# Patient Record
Sex: MALE | URBAN - METROPOLITAN AREA
[De-identification: names, ages, dates, MRNs, and addresses within clinical notes are randomized per-mention and may not be internally consistent; named-entity substitution may affect disease eponyms.]

---

## 2023-05-16 LAB
ANION GAP IN SER/PLAS: 13 MMOL/L (ref 10–20)
CALCIUM (MG/DL) IN SER/PLAS: 9.3 MG/DL (ref 8.6–10.3)
CARBON DIOXIDE, TOTAL (MMOL/L) IN SER/PLAS: 24 MMOL/L (ref 21–32)
CHLORIDE (MMOL/L) IN SER/PLAS: 107 MMOL/L (ref 98–107)
CREATININE (MG/DL) IN SER/PLAS: 1.03 MG/DL (ref 0.5–1.3)
ERYTHROCYTE DISTRIBUTION WIDTH (RATIO) BY AUTOMATED COUNT: 12.3 % (ref 11.5–14.5)
ERYTHROCYTE MEAN CORPUSCULAR HEMOGLOBIN CONCENTRATION (G/DL) BY AUTOMATED: 32.7 G/DL (ref 32–36)
ERYTHROCYTE MEAN CORPUSCULAR VOLUME (FL) BY AUTOMATED COUNT: 89 FL (ref 80–100)
ERYTHROCYTES (10*6/UL) IN BLOOD BY AUTOMATED COUNT: 4.61 X10E12/L (ref 4.5–5.9)
GFR MALE: 77 ML/MIN/1.73M2
GLUCOSE (MG/DL) IN SER/PLAS: 136 MG/DL (ref 74–99)
HEMATOCRIT (%) IN BLOOD BY AUTOMATED COUNT: 41 % (ref 41–52)
HEMOGLOBIN (G/DL) IN BLOOD: 13.4 G/DL (ref 13.5–17.5)
INR IN PPP BY COAGULATION ASSAY: 1.1 (ref 0.9–1.1)
LEUKOCYTES (10*3/UL) IN BLOOD BY AUTOMATED COUNT: 5.8 X10E9/L (ref 4.4–11.3)
PLATELETS (10*3/UL) IN BLOOD AUTOMATED COUNT: 155 X10E9/L (ref 150–450)
POTASSIUM (MMOL/L) IN SER/PLAS: 4.5 MMOL/L (ref 3.5–5.3)
PROTHROMBIN TIME (PT) IN PPP BY COAGULATION ASSAY: 12.2 SEC (ref 9.8–13.4)
SODIUM (MMOL/L) IN SER/PLAS: 139 MMOL/L (ref 136–145)
UREA NITROGEN (MG/DL) IN SER/PLAS: 30 MG/DL (ref 6–23)

## 2023-05-26 ENCOUNTER — HOSPITAL ENCOUNTER (OUTPATIENT)
Dept: DATA CONVERSION | Facility: HOSPITAL | Age: 73
End: 2023-05-26
Attending: INTERNAL MEDICINE | Admitting: INTERNAL MEDICINE

## 2023-05-26 DIAGNOSIS — E88.810 METABOLIC SYNDROME: ICD-10-CM

## 2023-05-26 DIAGNOSIS — E78.5 HYPERLIPIDEMIA, UNSPECIFIED: ICD-10-CM

## 2023-05-26 DIAGNOSIS — E66.9 OBESITY, UNSPECIFIED: ICD-10-CM

## 2023-05-26 DIAGNOSIS — I25.10 ATHEROSCLEROTIC HEART DISEASE OF NATIVE CORONARY ARTERY WITHOUT ANGINA PECTORIS: ICD-10-CM

## 2023-05-26 DIAGNOSIS — Z87.891 PERSONAL HISTORY OF NICOTINE DEPENDENCE: ICD-10-CM

## 2023-05-26 DIAGNOSIS — E11.9 TYPE 2 DIABETES MELLITUS WITHOUT COMPLICATIONS (MULTI): ICD-10-CM

## 2023-05-26 DIAGNOSIS — I10 ESSENTIAL (PRIMARY) HYPERTENSION: ICD-10-CM

## 2023-05-30 LAB
POC ACTIVATED CLOTTING TIME LOW RANGE: 262 SECONDS (ref 89–169)
POC ACTIVATED CLOTTING TIME LOW RANGE: 360 SECONDS (ref 89–169)

## 2023-06-14 LAB
ATRIAL RATE: 61 BPM
P AXIS: 21 DEGREES
P OFFSET: 173 MS
P ONSET: 118 MS
PR INTERVAL: 186 MS
Q ONSET: 211 MS
QRS COUNT: 10 BEATS
QRS DURATION: 120 MS
QT INTERVAL: 444 MS
QTC CALCULATION(BAZETT): 446 MS
QTC FREDERICIA: 446 MS
R AXIS: -42 DEGREES
T AXIS: 13 DEGREES
T OFFSET: 433 MS
VENTRICULAR RATE: 61 BPM

## 2023-06-20 LAB — SARS-COV-2 RESULT: NOT DETECTED

## 2023-07-06 LAB
ANION GAP IN SER/PLAS: 14 MMOL/L (ref 10–20)
CALCIUM (MG/DL) IN SER/PLAS: 8.9 MG/DL (ref 8.6–10.3)
CARBON DIOXIDE, TOTAL (MMOL/L) IN SER/PLAS: 26 MMOL/L (ref 21–32)
CHLORIDE (MMOL/L) IN SER/PLAS: 102 MMOL/L (ref 98–107)
CREATININE (MG/DL) IN SER/PLAS: 0.99 MG/DL (ref 0.5–1.3)
ERYTHROCYTE DISTRIBUTION WIDTH (RATIO) BY AUTOMATED COUNT: 12.4 % (ref 11.5–14.5)
ERYTHROCYTE MEAN CORPUSCULAR HEMOGLOBIN CONCENTRATION (G/DL) BY AUTOMATED: 30.9 G/DL (ref 32–36)
ERYTHROCYTE MEAN CORPUSCULAR VOLUME (FL) BY AUTOMATED COUNT: 91 FL (ref 80–100)
ERYTHROCYTES (10*6/UL) IN BLOOD BY AUTOMATED COUNT: 3.69 X10E12/L (ref 4.5–5.9)
GFR MALE: 80 ML/MIN/1.73M2
GLUCOSE (MG/DL) IN SER/PLAS: 122 MG/DL (ref 74–99)
HEMATOCRIT (%) IN BLOOD BY AUTOMATED COUNT: 33.7 % (ref 41–52)
HEMOGLOBIN (G/DL) IN BLOOD: 10.4 G/DL (ref 13.5–17.5)
LEUKOCYTES (10*3/UL) IN BLOOD BY AUTOMATED COUNT: 9.7 X10E9/L (ref 4.4–11.3)
MAGNESIUM (MG/DL) IN SER/PLAS: 1.72 MG/DL (ref 1.6–2.4)
PLATELETS (10*3/UL) IN BLOOD AUTOMATED COUNT: 447 X10E9/L (ref 150–450)
POTASSIUM (MMOL/L) IN SER/PLAS: 4.2 MMOL/L (ref 3.5–5.3)
SODIUM (MMOL/L) IN SER/PLAS: 138 MMOL/L (ref 136–145)
UREA NITROGEN (MG/DL) IN SER/PLAS: 20 MG/DL (ref 6–23)

## 2025-03-03 ENCOUNTER — TELEPHONE (OUTPATIENT)
Dept: CARDIOLOGY | Facility: HOSPITAL | Age: 75
End: 2025-03-03
Payer: MEDICARE

## 2025-03-03 NOTE — TELEPHONE ENCOUNTER
LVM for pt regarding appt with Dr. Cervantes - being referred for abnormal holter monitor results by Dr. Baker [facility]. Saved pt appt 3/6 at [facility] w/ Dr. Cervantes and req he call office to confirm.    Pt LVM returning my call and confirmed appt.

## 2025-03-05 PROBLEM — R35.1 NOCTURIA: Status: ACTIVE | Noted: 2021-07-25

## 2025-03-05 PROBLEM — I10 ESSENTIAL HYPERTENSION: Status: ACTIVE | Noted: 2017-02-22

## 2025-03-05 PROBLEM — I25.10 ARTERIOSCLEROSIS OF CORONARY ARTERY: Status: ACTIVE | Noted: 2023-05-16

## 2025-03-05 PROBLEM — C61 MALIGNANT NEOPLASM OF PROSTATE (MULTI): Status: ACTIVE | Noted: 2017-02-22

## 2025-03-05 PROBLEM — R91.1 LUNG NODULE: Status: ACTIVE | Noted: 2025-03-05

## 2025-03-05 PROBLEM — N52.9 MALE ERECTILE DYSFUNCTION, UNSPECIFIED: Status: ACTIVE | Noted: 2017-02-22

## 2025-03-05 PROBLEM — J44.9 CHRONIC OBSTRUCTIVE PULMONARY DISEASE (MULTI): Status: ACTIVE | Noted: 2025-03-05

## 2025-03-05 PROBLEM — G43.909 MIGRAINE HEADACHE: Status: ACTIVE | Noted: 2025-03-05

## 2025-03-05 PROBLEM — G47.33 OBSTRUCTIVE SLEEP APNEA SYNDROME: Status: ACTIVE | Noted: 2025-03-05

## 2025-03-05 PROBLEM — N45.1 EPIDIDYMITIS: Status: ACTIVE | Noted: 2017-02-22

## 2025-03-05 PROBLEM — R55 SYNCOPE AND COLLAPSE: Status: ACTIVE | Noted: 2025-01-29

## 2025-03-05 PROBLEM — K21.9 GASTROESOPHAGEAL REFLUX DISEASE WITHOUT ESOPHAGITIS: Status: ACTIVE | Noted: 2017-01-04

## 2025-03-05 PROBLEM — N41.9 PROSTATITIS: Status: ACTIVE | Noted: 2017-02-22

## 2025-03-05 PROBLEM — R97.20 ELEVATED PROSTATE SPECIFIC ANTIGEN (PSA): Status: ACTIVE | Noted: 2017-02-22

## 2025-03-05 PROBLEM — E11.9 DIABETES MELLITUS (MULTI): Status: ACTIVE | Noted: 2025-03-05

## 2025-03-05 PROBLEM — G45.9 TRANSIENT ISCHEMIC ATTACK: Status: ACTIVE | Noted: 2025-03-05

## 2025-03-05 RX ORDER — TAMSULOSIN HYDROCHLORIDE 0.4 MG/1
0.4 CAPSULE ORAL DAILY
COMMUNITY
Start: 2023-05-02 | End: 2025-03-06 | Stop reason: WASHOUT

## 2025-03-05 RX ORDER — GABAPENTIN 300 MG/1
300 CAPSULE ORAL 4 TIMES DAILY
COMMUNITY

## 2025-03-05 RX ORDER — HYDROCODONE BITARTRATE AND ACETAMINOPHEN 5; 325 MG/1; MG/1
TABLET ORAL
COMMUNITY
End: 2025-03-06 | Stop reason: WASHOUT

## 2025-03-05 RX ORDER — AMOXICILLIN AND CLAVULANATE POTASSIUM 875; 125 MG/1; MG/1
TABLET, FILM COATED ORAL
COMMUNITY
End: 2025-03-06 | Stop reason: WASHOUT

## 2025-03-05 RX ORDER — ASPIRIN 81 MG/1
81 TABLET ORAL
COMMUNITY
Start: 2023-05-02

## 2025-03-05 RX ORDER — PREDNISONE 20 MG/1
20 TABLET ORAL DAILY
COMMUNITY
End: 2025-03-06 | Stop reason: WASHOUT

## 2025-03-05 RX ORDER — DICYCLOMINE HYDROCHLORIDE 20 MG/1
20 TABLET ORAL
COMMUNITY
End: 2025-03-06 | Stop reason: WASHOUT

## 2025-03-05 RX ORDER — ATORVASTATIN CALCIUM 10 MG/1
1 TABLET, FILM COATED ORAL DAILY
COMMUNITY
End: 2025-03-06 | Stop reason: WASHOUT

## 2025-03-05 RX ORDER — ERGOCALCIFEROL 1.25 MG/1
50000 CAPSULE ORAL WEEKLY
COMMUNITY

## 2025-03-05 RX ORDER — LOSARTAN POTASSIUM 100 MG/1
1 TABLET ORAL DAILY
COMMUNITY
Start: 2023-05-02 | End: 2025-03-06 | Stop reason: WASHOUT

## 2025-03-05 RX ORDER — GLUCOSAMINE/MSM/CHONDROIT SULF 500-166.6
1 TABLET ORAL DAILY
COMMUNITY
Start: 2023-07-14 | End: 2025-03-06 | Stop reason: WASHOUT

## 2025-03-05 RX ORDER — BUPROPION HYDROCHLORIDE 300 MG/1
300 TABLET ORAL EVERY MORNING
COMMUNITY
End: 2025-03-06 | Stop reason: WASHOUT

## 2025-03-05 RX ORDER — METFORMIN HYDROCHLORIDE 750 MG/1
750 TABLET, EXTENDED RELEASE ORAL 2 TIMES DAILY
COMMUNITY

## 2025-03-05 RX ORDER — NITROGLYCERIN 0.4 MG/1
0.4 TABLET SUBLINGUAL EVERY 5 MIN PRN
COMMUNITY
Start: 2023-07-14

## 2025-03-05 RX ORDER — VIT C/E/ZN/COPPR/LUTEIN/ZEAXAN 250MG-90MG
1 CAPSULE ORAL DAILY
COMMUNITY

## 2025-03-05 RX ORDER — ROSUVASTATIN CALCIUM 10 MG/1
10 TABLET, COATED ORAL DAILY
COMMUNITY

## 2025-03-05 RX ORDER — METOPROLOL SUCCINATE 50 MG/1
1 TABLET, EXTENDED RELEASE ORAL
COMMUNITY
Start: 2025-01-28

## 2025-03-06 ENCOUNTER — OFFICE VISIT (OUTPATIENT)
Dept: CARDIOLOGY | Facility: CLINIC | Age: 75
End: 2025-03-06
Payer: MEDICARE

## 2025-03-06 VITALS
BODY MASS INDEX: 28.06 KG/M2 | HEART RATE: 67 BPM | HEIGHT: 70 IN | WEIGHT: 196 LBS | SYSTOLIC BLOOD PRESSURE: 120 MMHG | DIASTOLIC BLOOD PRESSURE: 70 MMHG

## 2025-03-06 DIAGNOSIS — R55 SYNCOPE AND COLLAPSE: ICD-10-CM

## 2025-03-06 DIAGNOSIS — I25.10 ARTERIOSCLEROSIS OF CORONARY ARTERY: Primary | ICD-10-CM

## 2025-03-06 DIAGNOSIS — R55 SYNCOPE AND COLLAPSE: Primary | ICD-10-CM

## 2025-03-06 PROCEDURE — 3078F DIAST BP <80 MM HG: CPT | Performed by: INTERNAL MEDICINE

## 2025-03-06 PROCEDURE — 93000 ELECTROCARDIOGRAM COMPLETE: CPT | Performed by: INTERNAL MEDICINE

## 2025-03-06 PROCEDURE — 3074F SYST BP LT 130 MM HG: CPT | Performed by: INTERNAL MEDICINE

## 2025-03-06 PROCEDURE — 1159F MED LIST DOCD IN RCRD: CPT | Performed by: INTERNAL MEDICINE

## 2025-03-06 PROCEDURE — 3008F BODY MASS INDEX DOCD: CPT | Performed by: INTERNAL MEDICINE

## 2025-03-06 PROCEDURE — 99203 OFFICE O/P NEW LOW 30 MIN: CPT | Performed by: INTERNAL MEDICINE

## 2025-03-06 RX ORDER — FLUTICASONE PROPIONATE 50 UG/1
1 POWDER, METERED RESPIRATORY (INHALATION)
COMMUNITY

## 2025-03-06 RX ORDER — OLOPATADINE HYDROCHLORIDE 1 MG/ML
1 SOLUTION/ DROPS OPHTHALMIC 2 TIMES DAILY
COMMUNITY

## 2025-03-06 NOTE — H&P (VIEW-ONLY)
Subjective:  The patient is a 74-year-old white male, followed primarily by Dr. Kenneth Reich, who presents for evaluation because of AV block on a recent event monitor.  He has a history of longstanding hypertension, type 2 diabetes, obstructive sleep apnea (on CPAP therapy), and coronary artery disease with a significant LAD stenosis for which she underwent MIDCAB surgery in June 2023 (off-pump ESTES to LAD).  The patient is known to have normal left ventricular systolic function with an ejection fraction of 60 to 65%.    Since late January 2025, the patient has had at least 3 episodes of sudden onset lightheadedness with near-syncope.  He feels as though he is shaking on the inside, though not physically having tremors.  He has to sit down, and his symptoms resolved over several minutes.  He has not had associated palpitations, nausea, emesis, or pallor.  With one of his episodes, he used a blood pressure cuff to record his vital signs and had no normal blood pressure but a heart rate in the low 40s.  He was fitted with a cardiac event monitor which she wore in February 2025.  He had a third episode of severe lightheadedness that correlated with 2:1 AV block, and even had 2 consecutive nonconducted P waves causing a pause of around 4 seconds.  His QRS complexes remained narrow.  Urgent electrophysiology referral was recommended.    The patient is  and lives in [place].  He is retired from various industrial jobs, including a job as an .  Because of his recent near-syncope, he has been instructed not to drive.    Current Outpatient Medications   Medication Sig    alfuzosin HCl (ALFUZOSIN ORAL) Take 10 mg by mouth once daily.    aspirin 81 mg EC tablet Take 1 tablet (81 mg) by mouth once daily.    empagliflozin (Jardiance) 10 mg Take 1 tablet (10 mg) by mouth early in the morning..    ergocalciferol (Vitamin D-2) 1250 mcg (50,000 units) capsule Take 1 capsule (50,000 Units) by mouth 1 (one) time  "per week.    fluticasone (Flovent Diskus) 50 mcg/actuation diskus inhaler Inhale 1 puff 2 times a day. Rinse mouth with water after use to reduce aftertaste and incidence of candidiasis. Do not swallow.    gabapentin (Neurontin) 300 mg capsule Take 1 capsule (300 mg) by mouth 4 times a day.    metFORMIN XR (Glucophage-XR) 750 mg 24 hr tablet Take 1 tablet (750 mg) by mouth 2 times a day.    metoprolol succinate XL (Toprol-XL) 50 mg 24 hr tablet Take 1 tablet (50 mg) by mouth early in the morning..    nitroglycerin (Nitrostat) 0.4 mg SL tablet Place 1 tablet (0.4 mg) under the tongue every 5 minutes if needed for chest pain. Nitroglycerin 0.4 MG Sublingual Tablet Sublingual DISSOLVE 1 TABLET UNDER THE TONGUE AS NEEDED FOR CHEST PAIN    olopatadine (Patanol) 0.1 % ophthalmic solution 1 drop 2 times a day.    omega-3 fatty acids-fish oil (Fish OiL) 360-1,200 mg capsule Take 1 capsule (1,200 mg) by mouth once daily.    rosuvastatin (Crestor) 10 mg tablet Take 1 tablet (10 mg) by mouth once daily.     Allergies:  Patient has no known allergies.     Patient Active Problem List   Diagnosis    Arteriosclerosis of coronary artery    Chronic obstructive pulmonary disease (Multi)    Diabetes mellitus (Multi)    Elevated prostate specific antigen (PSA)    Epididymitis    Prostatitis    Essential hypertension    Gastroesophageal reflux disease without esophagitis    Lung nodule    Male erectile dysfunction, unspecified    Malignant neoplasm of prostate (Multi)    Migraine headache    Mixed conductive and sensorineural hearing loss    Nocturia    Obstructive sleep apnea syndrome    Syncope and collapse    Transient ischemic attack     Past Surgical History:   Procedure Laterality Date    CHOLECYSTECTOMY      CORONARY ARTERY BYPASS GRAFT      MASTOID SURGERY      TONSILLECTOMY       Objective:  Vitals:    03/06/25 0926   BP: 120/70   Pulse: 67   Height:     1.778 m (5' 10\")  Weight: 88.9 kg (196 lb)     Exam:  Gen: Pleasant " gentleman in no distress; alert and oriented.  HEENT: No scleral icterus.  Neck: No jugular venous distention or thyromegaly.  Lungs: Clear to auscultation, with no wheezes or rales.  Heart: Regular rhythm without murmurs or gallops appreciated.  Abdomen: Benign, with no organomegaly or masses.  Extremities: Intact distal pulses; no edema.  Neuro: No focal neurologic abnormalities.  Skin: No cutaneous lesions.    EKG: An EKG on 3/6/2025 showed sinus rhythm at 67 bpm with a borderline first-degree AV block (KS 0.21 seconds), a left anterior fascicular block, early transition, and possible left ventricular hypertrophy    Impressions:  1.  Hypertension, controlled.  2.  Coronary artery disease, status post MIDCAB in 2023 with ESTES to LAD.  The patient has preserved LV systolic function and is free of angina.  3.  Type 2 diabetes, stable.  4.  Recent spells of abrupt lightheadedness, with one such episode correlating with 2:1 and transient high-grade AV block by event monitoring.  This is likely AV ayush rather than due to His-Purkinje disease in the setting of a narrow QRS complex, but represents a fairly strong indication for permanent pacing since it was not due to any reversible cause.  5.  Obstructive sleep apnea, on CPAP therapy.    Recommendations:  1.  I showed the patient a copy of his rhythm strips from the event monitor, including the spell of consecutive nonconducted P waves.  2.  I recommend permanent DDDR pacemaker placement in the near future with the use of a left bundle branch area lead for ventricular pacing to hopefully provide a narrow QRS complex for ventricular pacing, should the patient ultimately developed complete heart block and become pacemaker-dependent.  A Medtronic unit will be utilized.      Yang Cervantes MD    Is This A New Presentation, Or A Follow-Up?: Infection

## 2025-03-06 NOTE — PROGRESS NOTES
Subjective:  The patient is a 74-year-old white male, followed primarily by Dr. Kenneth Reich, who presents for evaluation because of AV block on a recent event monitor.  He has a history of longstanding hypertension, type 2 diabetes, obstructive sleep apnea (on CPAP therapy), and coronary artery disease with a significant LAD stenosis for which she underwent MIDCAB surgery in June 2023 (off-pump ESTES to LAD).  The patient is known to have normal left ventricular systolic function with an ejection fraction of 60 to 65%.    Since late January 2025, the patient has had at least 3 episodes of sudden onset lightheadedness with near-syncope.  He feels as though he is shaking on the inside, though not physically having tremors.  He has to sit down, and his symptoms resolved over several minutes.  He has not had associated palpitations, nausea, emesis, or pallor.  With one of his episodes, he used a blood pressure cuff to record his vital signs and had no normal blood pressure but a heart rate in the low 40s.  He was fitted with a cardiac event monitor which she wore in February 2025.  He had a third episode of severe lightheadedness that correlated with 2:1 AV block, and even had 2 consecutive nonconducted P waves causing a pause of around 4 seconds.  His QRS complexes remained narrow.  Urgent electrophysiology referral was recommended.    The patient is  and lives in [place].  He is retired from various industrial jobs, including a job as an .  Because of his recent near-syncope, he has been instructed not to drive.    Current Outpatient Medications   Medication Sig    alfuzosin HCl (ALFUZOSIN ORAL) Take 10 mg by mouth once daily.    aspirin 81 mg EC tablet Take 1 tablet (81 mg) by mouth once daily.    empagliflozin (Jardiance) 10 mg Take 1 tablet (10 mg) by mouth early in the morning..    ergocalciferol (Vitamin D-2) 1250 mcg (50,000 units) capsule Take 1 capsule (50,000 Units) by mouth 1 (one) time  "per week.    fluticasone (Flovent Diskus) 50 mcg/actuation diskus inhaler Inhale 1 puff 2 times a day. Rinse mouth with water after use to reduce aftertaste and incidence of candidiasis. Do not swallow.    gabapentin (Neurontin) 300 mg capsule Take 1 capsule (300 mg) by mouth 4 times a day.    metFORMIN XR (Glucophage-XR) 750 mg 24 hr tablet Take 1 tablet (750 mg) by mouth 2 times a day.    metoprolol succinate XL (Toprol-XL) 50 mg 24 hr tablet Take 1 tablet (50 mg) by mouth early in the morning..    nitroglycerin (Nitrostat) 0.4 mg SL tablet Place 1 tablet (0.4 mg) under the tongue every 5 minutes if needed for chest pain. Nitroglycerin 0.4 MG Sublingual Tablet Sublingual DISSOLVE 1 TABLET UNDER THE TONGUE AS NEEDED FOR CHEST PAIN    olopatadine (Patanol) 0.1 % ophthalmic solution 1 drop 2 times a day.    omega-3 fatty acids-fish oil (Fish OiL) 360-1,200 mg capsule Take 1 capsule (1,200 mg) by mouth once daily.    rosuvastatin (Crestor) 10 mg tablet Take 1 tablet (10 mg) by mouth once daily.     Allergies:  Patient has no known allergies.     Patient Active Problem List   Diagnosis    Arteriosclerosis of coronary artery    Chronic obstructive pulmonary disease (Multi)    Diabetes mellitus (Multi)    Elevated prostate specific antigen (PSA)    Epididymitis    Prostatitis    Essential hypertension    Gastroesophageal reflux disease without esophagitis    Lung nodule    Male erectile dysfunction, unspecified    Malignant neoplasm of prostate (Multi)    Migraine headache    Mixed conductive and sensorineural hearing loss    Nocturia    Obstructive sleep apnea syndrome    Syncope and collapse    Transient ischemic attack     Past Surgical History:   Procedure Laterality Date    CHOLECYSTECTOMY      CORONARY ARTERY BYPASS GRAFT      MASTOID SURGERY      TONSILLECTOMY       Objective:  Vitals:    03/06/25 0926   BP: 120/70   Pulse: 67   Height:     1.778 m (5' 10\")  Weight: 88.9 kg (196 lb)     Exam:  Gen: Pleasant " gentleman in no distress; alert and oriented.  HEENT: No scleral icterus.  Neck: No jugular venous distention or thyromegaly.  Lungs: Clear to auscultation, with no wheezes or rales.  Heart: Regular rhythm without murmurs or gallops appreciated.  Abdomen: Benign, with no organomegaly or masses.  Extremities: Intact distal pulses; no edema.  Neuro: No focal neurologic abnormalities.  Skin: No cutaneous lesions.    EKG: An EKG on 3/6/2025 showed sinus rhythm at 67 bpm with a borderline first-degree AV block (MI 0.21 seconds), a left anterior fascicular block, early transition, and possible left ventricular hypertrophy    Impressions:  1.  Hypertension, controlled.  2.  Coronary artery disease, status post MIDCAB in 2023 with ESTES to LAD.  The patient has preserved LV systolic function and is free of angina.  3.  Type 2 diabetes, stable.  4.  Recent spells of abrupt lightheadedness, with one such episode correlating with 2:1 and transient high-grade AV block by event monitoring.  This is likely AV ayush rather than due to His-Purkinje disease in the setting of a narrow QRS complex, but represents a fairly strong indication for permanent pacing since it was not due to any reversible cause.  5.  Obstructive sleep apnea, on CPAP therapy.    Recommendations:  1.  I showed the patient a copy of his rhythm strips from the event monitor, including the spell of consecutive nonconducted P waves.  2.  I recommend permanent DDDR pacemaker placement in the near future with the use of a left bundle branch area lead for ventricular pacing to hopefully provide a narrow QRS complex for ventricular pacing, should the patient ultimately developed complete heart block and become pacemaker-dependent.  A Medtronic unit will be utilized.      Yang Cervantes MD

## 2025-03-07 ENCOUNTER — TELEPHONE (OUTPATIENT)
Dept: CARDIOLOGY | Facility: HOSPITAL | Age: 75
End: 2025-03-07
Payer: MEDICARE

## 2025-03-07 NOTE — TELEPHONE ENCOUNTER
Pt agreeable to schedule Medtronic Dual PPM Implant with Dr. Cervantes Monday 3/24 1pm (arrive 12pm) [place]. Mailed pt reminder packet including lab orders 3/7 and task routed to Jessica RN for instructions call.     Paper checklist complete.

## 2025-03-18 LAB
ANION GAP SERPL CALCULATED.4IONS-SCNC: 11 MMOL/L (CALC) (ref 7–17)
BUN SERPL-MCNC: 26 MG/DL (ref 7–25)
BUN/CREAT SERPL: 22 (CALC) (ref 6–22)
CALCIUM SERPL-MCNC: 9.6 MG/DL (ref 8.6–10.3)
CHLORIDE SERPL-SCNC: 103 MMOL/L (ref 98–110)
CO2 SERPL-SCNC: 24 MMOL/L (ref 20–32)
CREAT SERPL-MCNC: 1.16 MG/DL (ref 0.7–1.28)
EGFRCR SERPLBLD CKD-EPI 2021: 66 ML/MIN/1.73M2
ERYTHROCYTE [DISTWIDTH] IN BLOOD BY AUTOMATED COUNT: 12.1 % (ref 11–15)
GLUCOSE SERPL-MCNC: 142 MG/DL (ref 65–139)
HCT VFR BLD AUTO: 44.3 % (ref 38.5–50)
HGB BLD-MCNC: 14.8 G/DL (ref 13.2–17.1)
MCH RBC QN AUTO: 28.8 PG (ref 27–33)
MCHC RBC AUTO-ENTMCNC: 33.4 G/DL (ref 32–36)
MCV RBC AUTO: 86.4 FL (ref 80–100)
PLATELET # BLD AUTO: 152 THOUSAND/UL (ref 140–400)
PMV BLD REES-ECKER: 11.8 FL (ref 7.5–12.5)
POTASSIUM SERPL-SCNC: 4.3 MMOL/L (ref 3.5–5.3)
RBC # BLD AUTO: 5.13 MILLION/UL (ref 4.2–5.8)
SODIUM SERPL-SCNC: 138 MMOL/L (ref 135–146)
WBC # BLD AUTO: 5 THOUSAND/UL (ref 3.8–10.8)

## 2025-03-21 ENCOUNTER — TELEPHONE (OUTPATIENT)
Dept: CARDIOLOGY | Facility: HOSPITAL | Age: 75
End: 2025-03-21
Payer: MEDICARE

## 2025-03-21 NOTE — TELEPHONE ENCOUNTER
Patient is scheduled for a pacemaker implant with Dr. Cervantes on 3/24/2025 at [place] with arrival time of noon. Labs are up to date. NPO after midnight the night before the procedure. Take morning medications with a sip of water. Patient confirms that he is not anticoagulated. Patient may need to stay overnight for observation. He will need a ride home. The patient verbalized understanding of instructions including appointment date, time, and location. All questions answered.

## 2025-03-24 ENCOUNTER — HOSPITAL ENCOUNTER (OUTPATIENT)
Facility: HOSPITAL | Age: 75
Discharge: HOME | End: 2025-03-25
Attending: INTERNAL MEDICINE | Admitting: INTERNAL MEDICINE
Payer: MEDICARE

## 2025-03-24 ENCOUNTER — APPOINTMENT (OUTPATIENT)
Dept: CARDIOLOGY | Facility: HOSPITAL | Age: 75
End: 2025-03-24
Payer: MEDICARE

## 2025-03-24 ENCOUNTER — APPOINTMENT (OUTPATIENT)
Dept: RADIOLOGY | Facility: HOSPITAL | Age: 75
End: 2025-03-24
Payer: MEDICARE

## 2025-03-24 DIAGNOSIS — R55 SYNCOPE AND COLLAPSE: Primary | ICD-10-CM

## 2025-03-24 LAB
GLUCOSE BLD MANUAL STRIP-MCNC: 161 MG/DL (ref 74–99)
GLUCOSE BLD MANUAL STRIP-MCNC: 254 MG/DL (ref 74–99)

## 2025-03-24 PROCEDURE — 93005 ELECTROCARDIOGRAM TRACING: CPT

## 2025-03-24 PROCEDURE — 2500000004 HC RX 250 GENERAL PHARMACY W/ HCPCS (ALT 636 FOR OP/ED): Performed by: INTERNAL MEDICINE

## 2025-03-24 PROCEDURE — C1785 PMKR, DUAL, RATE-RESP: HCPCS | Performed by: INTERNAL MEDICINE

## 2025-03-24 PROCEDURE — 2500000004 HC RX 250 GENERAL PHARMACY W/ HCPCS (ALT 636 FOR OP/ED): Performed by: NURSE PRACTITIONER

## 2025-03-24 PROCEDURE — C1898 LEAD, PMKR, OTHER THAN TRANS: HCPCS | Performed by: INTERNAL MEDICINE

## 2025-03-24 PROCEDURE — 33208 INSRT HEART PM ATRIAL & VENT: CPT | Performed by: INTERNAL MEDICINE

## 2025-03-24 PROCEDURE — 2500000005 HC RX 250 GENERAL PHARMACY W/O HCPCS: Performed by: NURSE PRACTITIONER

## 2025-03-24 PROCEDURE — 7100000009 HC PHASE TWO TIME - INITIAL BASE CHARGE: Performed by: INTERNAL MEDICINE

## 2025-03-24 PROCEDURE — 2780000003 HC OR 278 NO HCPCS: Performed by: INTERNAL MEDICINE

## 2025-03-24 PROCEDURE — C1892 INTRO/SHEATH,FIXED,PEEL-AWAY: HCPCS | Performed by: INTERNAL MEDICINE

## 2025-03-24 PROCEDURE — 82947 ASSAY GLUCOSE BLOOD QUANT: CPT

## 2025-03-24 PROCEDURE — 99153 MOD SED SAME PHYS/QHP EA: CPT | Performed by: INTERNAL MEDICINE

## 2025-03-24 PROCEDURE — 99152 MOD SED SAME PHYS/QHP 5/>YRS: CPT | Performed by: INTERNAL MEDICINE

## 2025-03-24 PROCEDURE — 2720000007 HC OR 272 NO HCPCS: Performed by: INTERNAL MEDICINE

## 2025-03-24 PROCEDURE — 7100000011 HC EXTENDED STAY RECOVERY HOURLY - NURSING UNIT

## 2025-03-24 PROCEDURE — 7100000010 HC PHASE TWO TIME - EACH INCREMENTAL 1 MINUTE: Performed by: INTERNAL MEDICINE

## 2025-03-24 PROCEDURE — 2500000001 HC RX 250 WO HCPCS SELF ADMINISTERED DRUGS (ALT 637 FOR MEDICARE OP): Performed by: NURSE PRACTITIONER

## 2025-03-24 PROCEDURE — 2750000001 HC OR 275 NO HCPCS: Performed by: INTERNAL MEDICINE

## 2025-03-24 PROCEDURE — C1887 CATHETER, GUIDING: HCPCS | Performed by: INTERNAL MEDICINE

## 2025-03-24 PROCEDURE — 71045 X-RAY EXAM CHEST 1 VIEW: CPT

## 2025-03-24 DEVICE — LEAD 383069 SELECT SECURE US EN FPA
Type: IMPLANTABLE DEVICE | Site: HEART | Status: FUNCTIONAL
Brand: SELECTSECURE™

## 2025-03-24 DEVICE — IPG W1DR01 AZURE XT DR MRI WL USA BCP
Type: IMPLANTABLE DEVICE | Site: CHEST  WALL | Status: FUNCTIONAL
Brand: AZURE™ XT DR MRI SURESCAN™

## 2025-03-24 DEVICE — LEAD 5076-52 CAPSUREFIX NOVUS US EN
Type: IMPLANTABLE DEVICE | Site: HEART | Status: FUNCTIONAL
Brand: CAPSUREFIX® NOVUS

## 2025-03-24 RX ORDER — ACETAMINOPHEN 650 MG/1
650 SUPPOSITORY RECTAL EVERY 4 HOURS PRN
Status: DISCONTINUED | OUTPATIENT
Start: 2025-03-24 | End: 2025-03-25 | Stop reason: HOSPADM

## 2025-03-24 RX ORDER — ASPIRIN 81 MG/1
81 TABLET ORAL
Status: DISCONTINUED | OUTPATIENT
Start: 2025-03-24 | End: 2025-03-25 | Stop reason: HOSPADM

## 2025-03-24 RX ORDER — ACETAMINOPHEN AND CODEINE PHOSPHATE 300; 30 MG/1; MG/1
1 TABLET ORAL EVERY 4 HOURS PRN
Status: DISCONTINUED | OUTPATIENT
Start: 2025-03-24 | End: 2025-03-25 | Stop reason: HOSPADM

## 2025-03-24 RX ORDER — NITROGLYCERIN 0.4 MG/1
0.4 TABLET SUBLINGUAL EVERY 5 MIN PRN
Status: DISCONTINUED | OUTPATIENT
Start: 2025-03-24 | End: 2025-03-25 | Stop reason: HOSPADM

## 2025-03-24 RX ORDER — ERGOCALCIFEROL 1.25 MG/1
50000 CAPSULE ORAL WEEKLY
Status: DISCONTINUED | OUTPATIENT
Start: 2025-03-24 | End: 2025-03-25 | Stop reason: HOSPADM

## 2025-03-24 RX ORDER — METOPROLOL SUCCINATE 50 MG/1
50 TABLET, EXTENDED RELEASE ORAL
Status: DISCONTINUED | OUTPATIENT
Start: 2025-03-24 | End: 2025-03-25 | Stop reason: HOSPADM

## 2025-03-24 RX ORDER — INSULIN LISPRO 100 [IU]/ML
0-5 INJECTION, SOLUTION INTRAVENOUS; SUBCUTANEOUS
Status: DISCONTINUED | OUTPATIENT
Start: 2025-03-24 | End: 2025-03-25 | Stop reason: HOSPADM

## 2025-03-24 RX ORDER — GABAPENTIN 300 MG/1
300 CAPSULE ORAL 3 TIMES DAILY
Status: DISCONTINUED | OUTPATIENT
Start: 2025-03-24 | End: 2025-03-25 | Stop reason: HOSPADM

## 2025-03-24 RX ORDER — NALOXONE HYDROCHLORIDE 0.4 MG/ML
0.2 INJECTION, SOLUTION INTRAMUSCULAR; INTRAVENOUS; SUBCUTANEOUS EVERY 5 MIN PRN
Status: DISCONTINUED | OUTPATIENT
Start: 2025-03-24 | End: 2025-03-25 | Stop reason: HOSPADM

## 2025-03-24 RX ORDER — ONDANSETRON 4 MG/1
4 TABLET, FILM COATED ORAL EVERY 8 HOURS PRN
Status: DISCONTINUED | OUTPATIENT
Start: 2025-03-24 | End: 2025-03-25 | Stop reason: HOSPADM

## 2025-03-24 RX ORDER — ONDANSETRON HYDROCHLORIDE 2 MG/ML
4 INJECTION, SOLUTION INTRAVENOUS EVERY 8 HOURS PRN
Status: DISCONTINUED | OUTPATIENT
Start: 2025-03-24 | End: 2025-03-25 | Stop reason: HOSPADM

## 2025-03-24 RX ORDER — ALFUZOSIN HYDROCHLORIDE 10 MG/1
10 TABLET, EXTENDED RELEASE ORAL
Status: DISCONTINUED | OUTPATIENT
Start: 2025-03-25 | End: 2025-03-24 | Stop reason: RX

## 2025-03-24 RX ORDER — LIDOCAINE HYDROCHLORIDE 20 MG/ML
INJECTION, SOLUTION INFILTRATION; PERINEURAL AS NEEDED
Status: DISCONTINUED | OUTPATIENT
Start: 2025-03-24 | End: 2025-03-24 | Stop reason: HOSPADM

## 2025-03-24 RX ORDER — FENTANYL CITRATE 50 UG/ML
INJECTION, SOLUTION INTRAMUSCULAR; INTRAVENOUS AS NEEDED
Status: DISCONTINUED | OUTPATIENT
Start: 2025-03-24 | End: 2025-03-24 | Stop reason: HOSPADM

## 2025-03-24 RX ORDER — MIDAZOLAM HYDROCHLORIDE 1 MG/ML
INJECTION, SOLUTION INTRAMUSCULAR; INTRAVENOUS AS NEEDED
Status: DISCONTINUED | OUTPATIENT
Start: 2025-03-24 | End: 2025-03-24 | Stop reason: HOSPADM

## 2025-03-24 RX ORDER — CHLORHEXIDINE GLUCONATE 40 MG/ML
SOLUTION TOPICAL ONCE
Status: COMPLETED | OUTPATIENT
Start: 2025-03-24 | End: 2025-03-24

## 2025-03-24 RX ORDER — ACETAMINOPHEN 325 MG/1
650 TABLET ORAL EVERY 4 HOURS PRN
Status: DISCONTINUED | OUTPATIENT
Start: 2025-03-24 | End: 2025-03-25 | Stop reason: HOSPADM

## 2025-03-24 RX ORDER — SODIUM CHLORIDE 9 MG/ML
30 INJECTION, SOLUTION INTRAVENOUS CONTINUOUS
Status: ACTIVE | OUTPATIENT
Start: 2025-03-24 | End: 2025-03-24

## 2025-03-24 RX ORDER — DEXTROSE 50 % IN WATER (D50W) INTRAVENOUS SYRINGE
12.5
Status: DISCONTINUED | OUTPATIENT
Start: 2025-03-24 | End: 2025-03-25 | Stop reason: HOSPADM

## 2025-03-24 RX ORDER — CEFAZOLIN SODIUM 2 G/50ML
2 SOLUTION INTRAVENOUS ONCE
Status: COMPLETED | OUTPATIENT
Start: 2025-03-24 | End: 2025-03-24

## 2025-03-24 RX ORDER — ROSUVASTATIN CALCIUM 10 MG/1
10 TABLET, COATED ORAL DAILY
Status: DISCONTINUED | OUTPATIENT
Start: 2025-03-25 | End: 2025-03-25 | Stop reason: HOSPADM

## 2025-03-24 RX ORDER — TAMSULOSIN HYDROCHLORIDE 0.4 MG/1
0.4 CAPSULE ORAL
Status: DISCONTINUED | OUTPATIENT
Start: 2025-03-25 | End: 2025-03-25 | Stop reason: HOSPADM

## 2025-03-24 RX ORDER — ACETAMINOPHEN 160 MG/5ML
650 SOLUTION ORAL EVERY 4 HOURS PRN
Status: DISCONTINUED | OUTPATIENT
Start: 2025-03-24 | End: 2025-03-25 | Stop reason: HOSPADM

## 2025-03-24 RX ORDER — DEXTROSE 50 % IN WATER (D50W) INTRAVENOUS SYRINGE
25
Status: DISCONTINUED | OUTPATIENT
Start: 2025-03-24 | End: 2025-03-25 | Stop reason: HOSPADM

## 2025-03-24 RX ADMIN — VANCOMYCIN HYDROCHLORIDE: 1 INJECTION, POWDER, LYOPHILIZED, FOR SOLUTION INTRAVENOUS at 15:05

## 2025-03-24 RX ADMIN — Medication: at 13:00

## 2025-03-24 RX ADMIN — SODIUM CHLORIDE 30 ML/HR: 9 INJECTION, SOLUTION INTRAVENOUS at 13:05

## 2025-03-24 RX ADMIN — ACETAMINOPHEN AND CODEINE PHOSPHATE 1 TABLET: 300; 30 TABLET ORAL at 18:55

## 2025-03-24 RX ADMIN — GABAPENTIN 300 MG: 300 CAPSULE ORAL at 20:58

## 2025-03-24 RX ADMIN — CEFAZOLIN SODIUM 2 G: 2 SOLUTION INTRAVENOUS at 13:50

## 2025-03-24 SDOH — SOCIAL STABILITY: SOCIAL INSECURITY: WITHIN THE LAST YEAR, HAVE YOU BEEN HUMILIATED OR EMOTIONALLY ABUSED IN OTHER WAYS BY YOUR PARTNER OR EX-PARTNER?: NO

## 2025-03-24 SDOH — ECONOMIC STABILITY: HOUSING INSECURITY: IN THE LAST 12 MONTHS, WAS THERE A TIME WHEN YOU WERE NOT ABLE TO PAY THE MORTGAGE OR RENT ON TIME?: NO

## 2025-03-24 SDOH — ECONOMIC STABILITY: HOUSING INSECURITY: AT ANY TIME IN THE PAST 12 MONTHS, WERE YOU HOMELESS OR LIVING IN A SHELTER (INCLUDING NOW)?: NO

## 2025-03-24 SDOH — SOCIAL STABILITY: SOCIAL INSECURITY: WERE YOU ABLE TO COMPLETE ALL THE BEHAVIORAL HEALTH SCREENINGS?: YES

## 2025-03-24 SDOH — ECONOMIC STABILITY: FOOD INSECURITY: WITHIN THE PAST 12 MONTHS, YOU WORRIED THAT YOUR FOOD WOULD RUN OUT BEFORE YOU GOT THE MONEY TO BUY MORE.: NEVER TRUE

## 2025-03-24 SDOH — SOCIAL STABILITY: SOCIAL INSECURITY
WITHIN THE LAST YEAR, HAVE YOU BEEN KICKED, HIT, SLAPPED, OR OTHERWISE PHYSICALLY HURT BY YOUR PARTNER OR EX-PARTNER?: NO

## 2025-03-24 SDOH — SOCIAL STABILITY: SOCIAL INSECURITY: DOES ANYONE TRY TO KEEP YOU FROM HAVING/CONTACTING OTHER FRIENDS OR DOING THINGS OUTSIDE YOUR HOME?: NO

## 2025-03-24 SDOH — ECONOMIC STABILITY: INCOME INSECURITY: IN THE PAST 12 MONTHS HAS THE ELECTRIC, GAS, OIL, OR WATER COMPANY THREATENED TO SHUT OFF SERVICES IN YOUR HOME?: NO

## 2025-03-24 SDOH — SOCIAL STABILITY: SOCIAL INSECURITY
WITHIN THE LAST YEAR, HAVE YOU BEEN RAPED OR FORCED TO HAVE ANY KIND OF SEXUAL ACTIVITY BY YOUR PARTNER OR EX-PARTNER?: NO

## 2025-03-24 SDOH — SOCIAL STABILITY: SOCIAL INSECURITY: WITHIN THE LAST YEAR, HAVE YOU BEEN AFRAID OF YOUR PARTNER OR EX-PARTNER?: NO

## 2025-03-24 SDOH — ECONOMIC STABILITY: HOUSING INSECURITY: IN THE PAST 12 MONTHS, HOW MANY TIMES HAVE YOU MOVED WHERE YOU WERE LIVING?: 0

## 2025-03-24 SDOH — ECONOMIC STABILITY: TRANSPORTATION INSECURITY: IN THE PAST 12 MONTHS, HAS LACK OF TRANSPORTATION KEPT YOU FROM MEDICAL APPOINTMENTS OR FROM GETTING MEDICATIONS?: NO

## 2025-03-24 SDOH — SOCIAL STABILITY: SOCIAL INSECURITY: ARE THERE ANY APPARENT SIGNS OF INJURIES/BEHAVIORS THAT COULD BE RELATED TO ABUSE/NEGLECT?: NO

## 2025-03-24 SDOH — ECONOMIC STABILITY: FOOD INSECURITY: WITHIN THE PAST 12 MONTHS, THE FOOD YOU BOUGHT JUST DIDN'T LAST AND YOU DIDN'T HAVE MONEY TO GET MORE.: NEVER TRUE

## 2025-03-24 SDOH — SOCIAL STABILITY: SOCIAL INSECURITY: ABUSE: ADULT

## 2025-03-24 SDOH — SOCIAL STABILITY: SOCIAL INSECURITY: DO YOU FEEL UNSAFE GOING BACK TO THE PLACE WHERE YOU ARE LIVING?: NO

## 2025-03-24 SDOH — SOCIAL STABILITY: SOCIAL INSECURITY: ARE YOU OR HAVE YOU BEEN THREATENED OR ABUSED PHYSICALLY, EMOTIONALLY, OR SEXUALLY BY ANYONE?: NO

## 2025-03-24 SDOH — SOCIAL STABILITY: SOCIAL INSECURITY: HAVE YOU HAD THOUGHTS OF HARMING ANYONE ELSE?: NO

## 2025-03-24 SDOH — ECONOMIC STABILITY: FOOD INSECURITY: HOW HARD IS IT FOR YOU TO PAY FOR THE VERY BASICS LIKE FOOD, HOUSING, MEDICAL CARE, AND HEATING?: NOT HARD AT ALL

## 2025-03-24 SDOH — SOCIAL STABILITY: SOCIAL INSECURITY: HAS ANYONE EVER THREATENED TO HURT YOUR FAMILY OR YOUR PETS?: NO

## 2025-03-24 SDOH — SOCIAL STABILITY: SOCIAL INSECURITY: DO YOU FEEL ANYONE HAS EXPLOITED OR TAKEN ADVANTAGE OF YOU FINANCIALLY OR OF YOUR PERSONAL PROPERTY?: NO

## 2025-03-24 ASSESSMENT — COGNITIVE AND FUNCTIONAL STATUS - GENERAL
DAILY ACTIVITIY SCORE: 24
MOBILITY SCORE: 24
PATIENT BASELINE BEDBOUND: NO

## 2025-03-24 ASSESSMENT — ACTIVITIES OF DAILY LIVING (ADL)
ADEQUATE_TO_COMPLETE_ADL: YES
JUDGMENT_ADEQUATE_SAFELY_COMPLETE_DAILY_ACTIVITIES: YES
BATHING: INDEPENDENT
TOILETING: INDEPENDENT
WALKS IN HOME: INDEPENDENT
FEEDING YOURSELF: INDEPENDENT
GROOMING: INDEPENDENT
ASSISTIVE_DEVICE: EYEGLASSES
LACK_OF_TRANSPORTATION: NO
DRESSING YOURSELF: INDEPENDENT
PATIENT'S MEMORY ADEQUATE TO SAFELY COMPLETE DAILY ACTIVITIES?: YES
HEARING - LEFT EAR: HEARING AID
HEARING - RIGHT EAR: HEARING AID

## 2025-03-24 ASSESSMENT — LIFESTYLE VARIABLES
HOW OFTEN DO YOU HAVE 6 OR MORE DRINKS ON ONE OCCASION: NEVER
AUDIT-C TOTAL SCORE: 1
AUDIT-C TOTAL SCORE: 1
SKIP TO QUESTIONS 9-10: 1
HOW OFTEN DO YOU HAVE A DRINK CONTAINING ALCOHOL: MONTHLY OR LESS
HOW MANY STANDARD DRINKS CONTAINING ALCOHOL DO YOU HAVE ON A TYPICAL DAY: 1 OR 2

## 2025-03-24 ASSESSMENT — PAIN - FUNCTIONAL ASSESSMENT
PAIN_FUNCTIONAL_ASSESSMENT: 0-10

## 2025-03-24 ASSESSMENT — PATIENT HEALTH QUESTIONNAIRE - PHQ9
2. FEELING DOWN, DEPRESSED OR HOPELESS: NOT AT ALL
SUM OF ALL RESPONSES TO PHQ9 QUESTIONS 1 & 2: 0
1. LITTLE INTEREST OR PLEASURE IN DOING THINGS: NOT AT ALL

## 2025-03-24 ASSESSMENT — PAIN SCALES - GENERAL
PAINLEVEL_OUTOF10: 0 - NO PAIN
PAINLEVEL_OUTOF10: 5 - MODERATE PAIN
PAINLEVEL_OUTOF10: 0 - NO PAIN

## 2025-03-24 ASSESSMENT — COLUMBIA-SUICIDE SEVERITY RATING SCALE - C-SSRS
1. IN THE PAST MONTH, HAVE YOU WISHED YOU WERE DEAD OR WISHED YOU COULD GO TO SLEEP AND NOT WAKE UP?: NO
6. HAVE YOU EVER DONE ANYTHING, STARTED TO DO ANYTHING, OR PREPARED TO DO ANYTHING TO END YOUR LIFE?: NO
2. HAVE YOU ACTUALLY HAD ANY THOUGHTS OF KILLING YOURSELF?: NO

## 2025-03-24 ASSESSMENT — PAIN DESCRIPTION - LOCATION: LOCATION: OTHER (COMMENT)

## 2025-03-24 NOTE — PROGRESS NOTES
Maged Rodriguez is a 74 y.o. male admitted for Syncope and collapse. Pharmacy reviewed the patient's [id number] medications and allergies for accuracy.    The list below reflects the PTA list prior to pharmacy medication history. A summary a changes to the PTA medication list has been listed below. Please review each medication in order reconciliation for additional clarification and justification.    Source of information: t2p     Medications added:    Medications modified:  Vitamin d2 1250mcg --> vitamin d2 50mcg 1 every day   Gabapentin 300mg- 1 qid --> 1 qam, 1 at 12pm, and 2 at bedtime     Medications to be removed:  Flovent diskus 50mcg   Pantol solution     Medications of concern:      Prior to Admission Medications   Prescriptions Last Dose Informant Patient Reported? Taking?   alfuzosin HCl (ALFUZOSIN ORAL) 3/23/2025 Bedtime  Yes Yes   Sig: Take 10 mg by mouth once daily.   aspirin 81 mg EC tablet 3/23/2025 Morning  Yes Yes   Sig: Take 1 tablet (81 mg) by mouth once daily.   empagliflozin (Jardiance) 10 mg 3/24/2025 Morning  Yes Yes   Sig: Take 1 tablet (10 mg) by mouth early in the morning..   ergocalciferol (Vitamin D-2) 1250 mcg (50,000 units) capsule 3/23/2025 Morning  Yes Yes   Sig: Take 1 capsule (50,000 Units) by mouth 1 (one) time per week.   fluticasone (Flovent Diskus) 50 mcg/actuation diskus inhaler Not Taking  Yes No   Sig: Inhale 1 puff 2 times a day. Rinse mouth with water after use to reduce aftertaste and incidence of candidiasis. Do not swallow.   Patient not taking: Reported on 3/24/2025   gabapentin (Neurontin) 300 mg capsule 3/24/2025 Morning  Yes Yes   Sig: Take 1 capsule (300 mg) by mouth 4 times a day.   metFORMIN XR (Glucophage-XR) 750 mg 24 hr tablet 3/24/2025 Morning  Yes Yes   Sig: Take 1 tablet (750 mg) by mouth 2 times a day.   metoprolol succinate XL (Toprol-XL) 50 mg 24 hr tablet 3/23/2025 Evening  Yes Yes   Sig: Take 1 tablet (50 mg) by mouth early in the morning..    nitroglycerin (Nitrostat) 0.4 mg SL tablet Unknown  Yes No   Sig: Place 1 tablet (0.4 mg) under the tongue every 5 minutes if needed for chest pain. Nitroglycerin 0.4 MG Sublingual Tablet Sublingual DISSOLVE 1 TABLET UNDER THE TONGUE AS NEEDED FOR CHEST PAIN   olopatadine (Patanol) 0.1 % ophthalmic solution Not Taking  Yes No   Si drop 2 times a day.   Patient not taking: Reported on 3/24/2025   omega-3 fatty acids-fish oil (Fish OiL) 360-1,200 mg capsule 3/23/2025 Evening  Yes Yes   Sig: Take 1 capsule (1,200 mg) by mouth once daily.   rosuvastatin (Crestor) 10 mg tablet 3/24/2025  Yes Yes   Sig: Take 1 tablet (10 mg) by mouth once daily.      Facility-Administered Medications: None       KEN GOODE

## 2025-03-24 NOTE — PRE-SEDATION DOCUMENTATION
Maged Rodriguez   Indication for procedure: The encounter diagnosis was Syncope and collapse. Patient here for DC PPM implant.         Pulse 59   Temp 36.1 °C (97 °F) (Temporal)   Resp 14   Wt 90.7 kg (200 lb)   SpO2 100%   BMI 28.70 kg/m²    Relevant Labs:   Lab Results   Component Value Date    CREATININE 1.16 03/17/2025    EGFR 66 03/17/2025    INR 1.0 06/22/2023    PROTIME 11.7 06/22/2023       Planned Sedation/Anesthesia: Moderate    Airway assessment: normal    Directed physical examination: General: Alert and Oriented, No distress, cooperative. Lungs: Clear to auscultation bilaterally, no wheezes, rhonci, or rales. respirations unlabored Heart: regular rate and rhythm, S1 and S2, no murmur, pulses palpable     Mallampati: III (soft and hard palate and base of uvula visible)    ASA Score: ASA 3 - Patient with moderate systemic disease with functional limitations    Benefits, risks and alternatives of procedure and planned sedation have been discussed with the patient and/or their representative. All questions answered and they agree to proceed.     Hilary Rodrigez, APRN-CNP

## 2025-03-24 NOTE — NURSING NOTE
1615:  Patient admitted to floor, introduced myself to the patient. Educated them on room, bed, call light, and medical equipment. Telemetry applied. Vital signs obtained. Patient assessed at this time and all orders reviewed.    Patient had Medtronic dual chamber pacer placed today. Dressing and ice applied to site. Clean dry and intact. Spec of dried blood on dressing. Vitals started    1800:  Chest xray and EKG complete    1900:  Report given to Hilary MEDINA

## 2025-03-24 NOTE — Clinical Note
The ECG shows a paced rhythm and a sinus rhythm. Pacer inserted. The patient has permanent pacemaker.

## 2025-03-24 NOTE — Clinical Note
Patient Clipped and Prepped: left chest. Prepped with ChloraPrep, a minimum of 3 minute dry time, longer if needed, no pooling noted, patient draped in sterile fashion. BY PARUL

## 2025-03-25 ENCOUNTER — APPOINTMENT (OUTPATIENT)
Dept: RADIOLOGY | Facility: HOSPITAL | Age: 75
End: 2025-03-25
Payer: MEDICARE

## 2025-03-25 VITALS
OXYGEN SATURATION: 93 % | DIASTOLIC BLOOD PRESSURE: 63 MMHG | HEIGHT: 70 IN | SYSTOLIC BLOOD PRESSURE: 138 MMHG | BODY MASS INDEX: 28.71 KG/M2 | RESPIRATION RATE: 20 BRPM | WEIGHT: 200.5 LBS | TEMPERATURE: 96.9 F | HEART RATE: 72 BPM

## 2025-03-25 PROBLEM — Z95.0 HISTORY OF CARDIAC PACEMAKER: Status: ACTIVE | Noted: 2025-03-25

## 2025-03-25 PROBLEM — I44.30 AV BLOCK: Status: ACTIVE | Noted: 2025-03-25

## 2025-03-25 LAB
ERYTHROCYTE [DISTWIDTH] IN BLOOD BY AUTOMATED COUNT: 12.3 % (ref 11.5–14.5)
GLUCOSE BLD MANUAL STRIP-MCNC: 169 MG/DL (ref 74–99)
HCT VFR BLD AUTO: 41.4 % (ref 41–52)
HGB BLD-MCNC: 13.7 G/DL (ref 13.5–17.5)
MCH RBC QN AUTO: 28.7 PG (ref 26–34)
MCHC RBC AUTO-ENTMCNC: 33.1 G/DL (ref 32–36)
MCV RBC AUTO: 87 FL (ref 80–100)
NRBC BLD-RTO: 0 /100 WBCS (ref 0–0)
PLATELET # BLD AUTO: 135 X10*3/UL (ref 150–450)
RBC # BLD AUTO: 4.77 X10*6/UL (ref 4.5–5.9)
WBC # BLD AUTO: 7.3 X10*3/UL (ref 4.4–11.3)

## 2025-03-25 PROCEDURE — 2500000001 HC RX 250 WO HCPCS SELF ADMINISTERED DRUGS (ALT 637 FOR MEDICARE OP): Performed by: NURSE PRACTITIONER

## 2025-03-25 PROCEDURE — 99239 HOSP IP/OBS DSCHRG MGMT >30: CPT | Performed by: PHYSICIAN ASSISTANT

## 2025-03-25 PROCEDURE — 85027 COMPLETE CBC AUTOMATED: CPT | Performed by: NURSE PRACTITIONER

## 2025-03-25 PROCEDURE — 36415 COLL VENOUS BLD VENIPUNCTURE: CPT | Performed by: NURSE PRACTITIONER

## 2025-03-25 PROCEDURE — 82947 ASSAY GLUCOSE BLOOD QUANT: CPT

## 2025-03-25 PROCEDURE — 71046 X-RAY EXAM CHEST 2 VIEWS: CPT

## 2025-03-25 PROCEDURE — 7100000011 HC EXTENDED STAY RECOVERY HOURLY - NURSING UNIT

## 2025-03-25 RX ADMIN — GABAPENTIN 300 MG: 300 CAPSULE ORAL at 10:37

## 2025-03-25 RX ADMIN — EMPAGLIFLOZIN 10 MG: 10 TABLET, FILM COATED ORAL at 06:07

## 2025-03-25 RX ADMIN — ACETAMINOPHEN AND CODEINE PHOSPHATE 1 TABLET: 300; 30 TABLET ORAL at 06:09

## 2025-03-25 RX ADMIN — METOPROLOL SUCCINATE 50 MG: 50 TABLET, EXTENDED RELEASE ORAL at 06:07

## 2025-03-25 RX ADMIN — ASPIRIN 81 MG: 81 TABLET, COATED ORAL at 06:07

## 2025-03-25 ASSESSMENT — PAIN - FUNCTIONAL ASSESSMENT
PAIN_FUNCTIONAL_ASSESSMENT: 0-10
PAIN_FUNCTIONAL_ASSESSMENT: 0-10

## 2025-03-25 ASSESSMENT — PAIN SCALES - GENERAL
PAINLEVEL_OUTOF10: 5 - MODERATE PAIN
PAINLEVEL_OUTOF10: 3

## 2025-03-25 ASSESSMENT — ENCOUNTER SYMPTOMS
NAUSEA: 0
WHEEZING: 0
DYSURIA: 0
PALPITATIONS: 0
DIARRHEA: 0
VOMITING: 0
FEVER: 0
ABDOMINAL PAIN: 0
ORTHOPNEA: 0
WEAKNESS: 0
SHORTNESS OF BREATH: 0

## 2025-03-25 ASSESSMENT — PAIN DESCRIPTION - ORIENTATION: ORIENTATION: LEFT

## 2025-03-25 ASSESSMENT — PAIN DESCRIPTION - LOCATION: LOCATION: CHEST

## 2025-03-25 NOTE — NURSING NOTE
0700:  Report received from Hilary MEDINA    0900:  EKG, xray, Medtronic device interrogated    0930:  Discharge order placed    1045:  Discharge order in place. All discharge instructions printed and reviewed with patient including medications, follow up appointment, diet, and activity. IV and tele removed. Medications delivered. No questions at this time. Patient discharged home with family at this time.

## 2025-03-25 NOTE — DISCHARGE SUMMARY
CARDIOLOGY DISCHARGE SUMMARY  Discharge Diagnosis  Syncope and collapse    Subjective Data:  The patient is a 74-year-old white male, followed primarily by Dr. Kenneth Reich, who presents for evaluation because of AV block on a recent event monitor.  He has a history of longstanding hypertension, type 2 diabetes, obstructive sleep apnea (on CPAP therapy), and coronary artery disease with a significant LAD stenosis for which she underwent MIDCAB surgery in June 2023 (off-pump ESTES to LAD).  The patient is known to have normal left ventricular systolic function with an ejection fraction of 60 to 65%.     Since late January 2025, the patient has had at least 3 episodes of sudden onset lightheadedness with near-syncope.  He feels as though he is shaking on the inside, though not physically having tremors.  He has to sit down, and his symptoms resolved over several minutes.  He has not had associated palpitations, nausea, emesis, or pallor.  With one of his episodes, he used a blood pressure cuff to record his vital signs and had no normal blood pressure but a heart rate in the low 40s.  He was fitted with a cardiac event monitor which she wore in February 2025.  He had a third episode of severe lightheadedness that correlated with 2:1 AV block, and even had 2 consecutive nonconducted P waves causing a pause of around 4 seconds.  His QRS complexes remained narrow.  Urgent electrophysiology referral was recommended.     The patient is  and lives in [place].  He is retired from various industrial jobs, including a job as an .  Because of his recent near-syncope, he has been instructed not to drive.   3-25-25: Patient on uncomplicated dual-chamber pacemaker implantation yesterday with Dr. Cervantes.  Patient not having any concerning symptoms or significant pain.  Chest x-rays show normal lead placement and no evidence of pneumothorax.  Telemetry currently with sinus rhythm and ventricular pacing.  Device  check by the [employer] representative shows normal parameters.  Reviewed briefly with Dr. Cervantes and he is okay for discharge.    Last Labs:  CBC - 3/25/2025:  4:05 AM  7.3 13.7 135    41.4      CMP - 3/17/2025: 10:54 AM  9.6 _ _ --- _   _ _ _ _      PTT - No results in last year.  _   _ _     Hospital Course   Uneventful placement of dual-chamber pacemaker implantation.  Again follow-up evaluation with device check and chest x-ray unremarkable.    Echo:  Transthoracic Echo Complete --      Transthoracic Echo Complete       Echocardiogram Stress Test       Review of Systems   Constitutional: Negative for fever and malaise/fatigue.   Cardiovascular:  Negative for chest pain, orthopnea and palpitations.   Respiratory:  Negative for shortness of breath and wheezing.    Skin:  Negative for itching and rash.   Gastrointestinal:  Negative for abdominal pain, diarrhea, nausea and vomiting.   Genitourinary:  Negative for dysuria.   Neurological:  Negative for weakness.        Pertinent Physical Exam At Time of Discharge  Physical Exam  Constitutional:       General: He is not in acute distress.  HENT:      Mouth/Throat:      Mouth: Mucous membranes are moist.   Neck:      Comments: Flat neck veins  Cardiovascular:      Rate and Rhythm: Normal rate and regular rhythm.      Heart sounds: Normal heart sounds. No murmur heard.     Comments: Device pocket is flat with some old blood on dressing without significant discomfort.  Some bruising extending down toward the axilla.  Telemetry is sinus rhythm with ventricular pacing  Pulmonary:      Effort: Pulmonary effort is normal.      Breath sounds: Normal breath sounds.   Abdominal:      General: Abdomen is flat. Bowel sounds are normal.      Palpations: Abdomen is soft.   Musculoskeletal:         General: No swelling.   Skin:     General: Skin is warm and dry.   Neurological:      Mental Status: He is alert and oriented to person, place, and time.   Psychiatric:         Mood  and Affect: Mood normal.        ASSESSMENT/PLAN  1.  Hypertension, controlled.  2.  Coronary artery disease, status post MIDCAB in 2023 with ESTES to LAD.  The patient has preserved LV systolic function and is free of angina.  3.  Type 2 diabetes, stable.  4.  Recent spells of abrupt lightheadedness, with one such episode correlating with 2:1 and transient high-grade AV block by event monitoring.  This is likely AV ayush rather than due to His-Purkinje disease in the setting of a narrow QRS complex, but represents a fairly strong indication for permanent pacing since it was not due to any reversible cause.  5.  Obstructive sleep apnea, on CPAP therapy.     Recommendations:  1.  I showed the patient a copy of his rhythm strips from the event monitor, including the spell of consecutive nonconducted P waves.  2.  I recommend permanent DDDR pacemaker placement in the near future with the use of a left bundle branch area lead for ventricular pacing to hopefully provide a narrow QRS complex for ventricular pacing, should the patient ultimately developed complete heart block and become pacemaker-dependent.  A [employer] unit will be utilized.   3-25-25: Status post successful implantation of dual-chamber pacemaker implantation.  Device pocket is unremarkable.  Device parameters per [employer] rep are normal.  Chest x-ray is unremarkable for pneumothorax and appropriate lead placement noted.  Resume usual cardiac medications including the metoprolol for blood pressure control.  Patient is to report any further cardiac issues to the office and otherwise we will follow the patient up as outpatient in the device clinic and that appointment is scheduled for May 1.  Patient is stable for discharge from cardiac standpoint at this time.    Home Medications     Medication List      CONTINUE taking these medications     ALFUZOSIN ORAL   aspirin 81 mg EC tablet   empagliflozin 10 mg; Commonly known as: Jardiance   ergocalciferol  (vitamin D2) 50 mcg (2,000 unit) tablet   Fish OiL 360-1,200 mg capsule; Generic drug: omega-3 fatty acids-fish   oil   gabapentin 300 mg capsule; Commonly known as: Neurontin   metFORMIN  mg 24 hr tablet; Commonly known as: Glucophage-XR   metoprolol succinate XL 50 mg 24 hr tablet; Commonly known as: Toprol-XL   nitroglycerin 0.4 mg SL tablet; Commonly known as: Nitrostat   rosuvastatin 10 mg tablet; Commonly known as: Crestor       Outpatient Follow-Up  Future Appointments   Date Time Provider Department Center   5/1/2025  8:00 AM [facility]       Ralf Powers PA-C  3/25/2025  9:21 AM

## 2025-03-25 NOTE — CARE PLAN
The patient's goals for the shift include stay informed    The clinical goals for the shift include remain comfortable      Problem: Pain - Adult  Goal: Verbalizes/displays adequate comfort level or baseline comfort level  Outcome: Progressing     Problem: Safety - Adult  Goal: Free from fall injury  Outcome: Progressing     Problem: Discharge Planning  Goal: Discharge to home or other facility with appropriate resources  Outcome: Progressing     Problem: Chronic Conditions and Co-morbidities  Goal: Patient's chronic conditions and co-morbidity symptoms are monitored and maintained or improved  Outcome: Progressing     Problem: Nutrition  Goal: Nutrient intake appropriate for maintaining nutritional needs  Outcome: Progressing     Problem: Diabetes  Goal: Achieve decreasing blood glucose levels by end of shift  Outcome: Progressing  Goal: Increase stability of blood glucose readings by end of shift  Outcome: Progressing  Goal: Decrease in ketones present in urine by end of shift  Outcome: Progressing  Goal: Maintain electrolyte levels within acceptable range throughout shift  Outcome: Progressing  Goal: Maintain glucose levels >70mg/dl to <250mg/dl throughout shift  Outcome: Progressing  Goal: No changes in neurological exam by end of shift  Outcome: Progressing  Goal: Learn about and adhere to nutrition recommendations by end of shift  Outcome: Progressing  Goal: Vital signs within normal range for age by end of shift  Outcome: Progressing  Goal: Increase self care and/or family involovement by end of shift  Outcome: Progressing  Goal: Receive DSME education by end of shift  Outcome: Progressing     Problem: Pain  Goal: Takes deep breaths with improved pain control throughout the shift  Outcome: Progressing  Goal: Turns in bed with improved pain control throughout the shift  Outcome: Progressing  Goal: Walks with improved pain control throughout the shift  Outcome: Progressing  Goal: Performs ADL's with improved  pain control throughout shift  Outcome: Progressing  Goal: Participates in PT with improved pain control throughout the shift  Outcome: Progressing  Goal: Free from opioid side effects throughout the shift  Outcome: Progressing  Goal: Free from acute confusion related to pain meds throughout the shift  Outcome: Progressing

## 2025-04-01 ENCOUNTER — HOSPITAL ENCOUNTER (OUTPATIENT)
Dept: CARDIOLOGY | Facility: HOSPITAL | Age: 75
Discharge: HOME | End: 2025-04-01
Payer: MEDICARE

## 2025-04-01 DIAGNOSIS — Z95.0 PRESENCE OF CARDIAC PACEMAKER: ICD-10-CM

## 2025-04-01 DIAGNOSIS — I44.30 UNSPECIFIED ATRIOVENTRICULAR BLOCK: ICD-10-CM

## 2025-04-01 LAB
ATRIAL RATE: 65 BPM
P AXIS: 13 DEGREES
P OFFSET: 152 MS
P ONSET: 101 MS
PR INTERVAL: 210 MS
Q ONSET: 206 MS
QRS COUNT: 11 BEATS
QRS DURATION: 114 MS
QT INTERVAL: 416 MS
QTC CALCULATION(BAZETT): 432 MS
QTC FREDERICIA: 427 MS
R AXIS: -49 DEGREES
T AXIS: 167 DEGREES
T OFFSET: 414 MS
VENTRICULAR RATE: 65 BPM

## 2025-05-01 ENCOUNTER — HOSPITAL ENCOUNTER (OUTPATIENT)
Dept: CARDIOLOGY | Facility: HOSPITAL | Age: 75
Discharge: HOME | End: 2025-05-01
Payer: MEDICARE

## 2025-05-01 DIAGNOSIS — Z95.0 PRESENCE OF CARDIAC PACEMAKER: Primary | ICD-10-CM

## 2025-05-01 DIAGNOSIS — I44.30 UNSPECIFIED ATRIOVENTRICULAR BLOCK: ICD-10-CM

## 2025-05-01 DIAGNOSIS — R55 SYNCOPE AND COLLAPSE: ICD-10-CM

## 2025-05-01 PROCEDURE — 93280 PM DEVICE PROGR EVAL DUAL: CPT

## 2025-06-30 ENCOUNTER — HOSPITAL ENCOUNTER (OUTPATIENT)
Dept: CARDIOLOGY | Facility: HOSPITAL | Age: 75
Discharge: HOME | End: 2025-06-30
Payer: MEDICARE

## 2025-06-30 DIAGNOSIS — Z95.0 PRESENCE OF CARDIAC PACEMAKER: ICD-10-CM

## 2025-06-30 DIAGNOSIS — R55 SYNCOPE AND COLLAPSE: ICD-10-CM

## 2025-06-30 DIAGNOSIS — I44.30 UNSPECIFIED ATRIOVENTRICULAR BLOCK: ICD-10-CM

## 2025-06-30 DIAGNOSIS — Z95.0 PRESENCE OF CARDIAC PACEMAKER: Primary | ICD-10-CM

## (undated) DEVICE — SLITTER, UNIVERSAL II

## (undated) DEVICE — CATHETER, ACUMEN C315, FIXED, HIS SHAPE

## (undated) DEVICE — INTRODUCER, OPTI SEAL, VALVED PEELABLE, W/SIDEPORT, 7FR X 13CM, ORANGE

## (undated) DEVICE — GUIDEWIRE, INQWIRE, 3MM J, .035 X 210CM, FIXED